# Patient Record
Sex: FEMALE | Race: WHITE | NOT HISPANIC OR LATINO | URBAN - NONMETROPOLITAN AREA
[De-identification: names, ages, dates, MRNs, and addresses within clinical notes are randomized per-mention and may not be internally consistent; named-entity substitution may affect disease eponyms.]

---

## 2020-11-10 ENCOUNTER — OFFICE VISIT (OUTPATIENT)
Dept: URBAN - NONMETROPOLITAN AREA CLINIC 13 | Facility: CLINIC | Age: 25
End: 2020-11-10

## 2020-12-16 ENCOUNTER — DASHBOARD ENCOUNTERS (OUTPATIENT)
Age: 25
End: 2020-12-16

## 2020-12-16 ENCOUNTER — WEB ENCOUNTER (OUTPATIENT)
Dept: URBAN - NONMETROPOLITAN AREA CLINIC 2 | Facility: CLINIC | Age: 25
End: 2020-12-16

## 2020-12-16 ENCOUNTER — OFFICE VISIT (OUTPATIENT)
Dept: URBAN - NONMETROPOLITAN AREA CLINIC 2 | Facility: CLINIC | Age: 25
End: 2020-12-16
Payer: COMMERCIAL

## 2020-12-16 VITALS
WEIGHT: 110 LBS | SYSTOLIC BLOOD PRESSURE: 122 MMHG | HEIGHT: 62 IN | DIASTOLIC BLOOD PRESSURE: 84 MMHG | BODY MASS INDEX: 20.24 KG/M2 | HEART RATE: 72 BPM

## 2020-12-16 DIAGNOSIS — R17 YELLOW EYES: ICD-10-CM

## 2020-12-16 DIAGNOSIS — R93.89 ABNORMAL CT SCAN: ICD-10-CM

## 2020-12-16 PROBLEM — 129679001: Status: ACTIVE | Noted: 2020-12-16

## 2020-12-16 PROCEDURE — G8427 DOCREV CUR MEDS BY ELIG CLIN: HCPCS | Performed by: NURSE PRACTITIONER

## 2020-12-16 PROCEDURE — 99204 OFFICE O/P NEW MOD 45 MIN: CPT | Performed by: NURSE PRACTITIONER

## 2020-12-16 PROCEDURE — G8420 CALC BMI NORM PARAMETERS: HCPCS | Performed by: NURSE PRACTITIONER

## 2020-12-16 PROCEDURE — G9903 PT SCRN TBCO ID AS NON USER: HCPCS | Performed by: NURSE PRACTITIONER

## 2020-12-16 NOTE — HPI-TODAY'S VISIT:
12/16/2020 Ms. Moser is here for abnormal imaging of her pancreas. She needed a check up for insurance purposes and had routine lab work that showed low glucose levels. She had follow up labs with high insulin levels. There was a concern for insulinoma so a CT scan was done. This was done the first of October and showed a focal expansion of the HOP suggestive of chronic focal nodular pancreatitis and mass measuring 2cm. There was no ductal dilatation. She had a F/U MRI 2 weeks later that was normal. She denies any hx of pancreatitis. She denies any family hx of pancreatitis or pancreatic cancer. She denies any abdominal pain, nausea, vomiting, or change in weight or appetite. She has had intermittent yellowing of her eyes which she thought was related to anemia. She denies ever having elevated liver tests or bilirubin. CS

## 2021-01-22 ENCOUNTER — LAB OUTSIDE AN ENCOUNTER (OUTPATIENT)
Dept: URBAN - NONMETROPOLITAN AREA CLINIC 2 | Facility: CLINIC | Age: 26
End: 2021-01-22